# Patient Record
Sex: MALE | Race: OTHER | Employment: FULL TIME | URBAN - METROPOLITAN AREA
[De-identification: names, ages, dates, MRNs, and addresses within clinical notes are randomized per-mention and may not be internally consistent; named-entity substitution may affect disease eponyms.]

---

## 2021-09-05 ENCOUNTER — HOSPITAL ENCOUNTER (EMERGENCY)
Age: 31
Discharge: HOME | End: 2021-09-05
Payer: COMMERCIAL

## 2021-09-05 VITALS
HEART RATE: 98 BPM | DIASTOLIC BLOOD PRESSURE: 86 MMHG | RESPIRATION RATE: 16 BRPM | TEMPERATURE: 98 F | HEIGHT: 75 IN | OXYGEN SATURATION: 97 % | BODY MASS INDEX: 27.14 KG/M2 | SYSTOLIC BLOOD PRESSURE: 145 MMHG | WEIGHT: 218.26 LBS

## 2021-09-05 DIAGNOSIS — K62.5 RECTAL BLEEDING: Primary | ICD-10-CM

## 2021-09-05 LAB
CHP ED QC CHECK: NORMAL
HCT VFR BLD CALC: 50.7 % (ref 37–54)
HEMOCCULT STL QL: POSITIVE
HEMOGLOBIN: 17.2 G/DL (ref 12.5–16.5)
MCH RBC QN AUTO: 29.2 PG (ref 26–35)
MCHC RBC AUTO-ENTMCNC: 33.9 % (ref 32–34.5)
MCV RBC AUTO: 85.9 FL (ref 80–99.9)
PDW BLD-RTO: 13 FL (ref 11.5–15)
PLATELET # BLD: 367 E9/L (ref 130–450)
PMV BLD AUTO: 9.8 FL (ref 7–12)
RBC # BLD: 5.9 E12/L (ref 3.8–5.8)
WBC # BLD: 9.4 E9/L (ref 4.5–11.5)

## 2021-09-05 PROCEDURE — 99282 EMERGENCY DEPT VISIT SF MDM: CPT

## 2021-09-05 PROCEDURE — 85027 COMPLETE CBC AUTOMATED: CPT

## 2021-09-05 NOTE — ED NOTES
FIRST PROVIDER CONTACT ASSESSMENT NOTE                                                                                                Department of Emergency Medicine                                                      First Provider Note  21  2:29 PM EDT  NAME: Hiwot Katz  : 1990  MRN: [id number]    Chief Complaint: Rectal Bleeding (had blood with bowel movement)      History of Present Illness:   Hiwot Katz is a 27 y.o. male who presents to the ED for rectal pain with blood in stool x 1 wk     Focused Physical Exam:  VS:    ED Triage Vitals   BP Temp Temp src Pulse Resp SpO2 Height Weight   21 1429 21 1429 -- 21 1354 21 1429 21 1354 21 1429 21 1429   (!) 151/106 98 °F (36.7 °C)  107 16 97 % 6' 3\" (1.905 m) 218 lb 4.1 oz (99 kg)        General: Alert and in no apparent distress. Medical History:  has no past medical history on file. Surgical History:  has no past surgical history on file. Social History:      Family History: family history is not on file. Allergies: Patient has no known allergies.      Initial Plan of Care:  Initiate Treatment-Testing, Proceed toTreatment Area When Bed Available for ED Attending/MLP to Continue Care    -------------------------------------------------[address] CONTACT ASSESSMENT NOTE--------------------------------------------------------  Electronically signed by ENRIQUE Reardon   DD: 21       ENRIQUE Alves  [phone]

## 2021-09-05 NOTE — ED PROVIDER NOTES
[facility]    HPI:  9/5/21,   Time: 5:42 PM EDT         Farooq Corea is a 27 y.o. male presenting to the ED for rectal bleeding, beginning earlier today ago. The complaint has been intermittent, mild in severity, and worsened by nothing. Patient presents stating that after having a bowel movement earlier today after wiping he noticed that there was blood on the tissue paper concerned him. He denies any abdominal pain. Denies any known history of hemorrhoids. ROS:   Pertinent positives and negatives are stated within HPI, all other systems reviewed and are negative.  --------------------------------------------- PAST HISTORY ---------------------------------------------  Past Medical History:  has no past medical history on file. Past Surgical History:  has no past surgical history on file. Social History:  reports that he has never smoked. He has never used smokeless tobacco. He reports that he does not drink alcohol. Family History: family history is not on file. The patients home medications have been reviewed. Allergies: Patient has no known allergies.     -------------------------------------------------- RESULTS -------------------------------------------------  All laboratory and radiology results have been personally reviewed by myself   LABS:  Results for orders placed or performed during the hospital encounter of 09/05/21   CBC   Result Value Ref Range    WBC 9.4 4.5 - 11.5 E9/L    RBC 5.90 (H) 3.80 - 5.80 E12/L    Hemoglobin 17.2 (H) 12.5 - 16.5 g/dL    Hematocrit 50.7 37.0 - 54.0 %    MCV 85.9 80.0 - 99.9 fL    MCH 29.2 26.0 - 35.0 pg    MCHC 33.9 32.0 - 34.5 %    RDW 13.0 11.5 - 15.0 fL    Platelets 709 078 - 577 E9/L    MPV 9.8 7.0 - 12.0 fL   POCT occult blood stool   Result Value Ref Range    OCCULT BLOOD FECAL positive     QC OK? p        RADIOLOGY:  Interpreted by Radiologist.  No orders to display       ------------------------- NURSING NOTES AND VITALS REVIEWED ---------------------------   The nursing notes within the ED encounter and vital signs as below have been reviewed. BP (!) 145/86   Pulse 98   Temp 98 °F (36.7 °C)   Resp 16   Ht 6' 3\" (1.905 m)   Wt 218 lb 4.1 oz (99 kg)   SpO2 97%   BMI 27.28 kg/m²   Oxygen Saturation Interpretation: Normal      ---------------------------------------------------PHYSICAL EXAM--------------------------------------      Constitutional/General: Alert and oriented x3, well appearing, non toxic in NAD  Head: NC/AT  Eyes: PERRL, EOMI  Mouth: Oropharynx clear, handling secretions, no trismus  Neck: Supple, full ROM, no meningeal signs  Pulmonary: Lungs clear to auscultation bilaterally, no wheezes, rales, or rhonchi. Not in respiratory distress  Cardiovascular:  Regular rate and rhythm, no murmurs, gallops, or rubs. 2+ distal pulses  Abdomen: Soft, non tender, non distended, digital rectal exam no hemorrhoids, fissures or abnormality stool is brown on gloved finger however is faintly guaiac positive. Extremities: Moves all extremities x 4. Warm and well perfused  Skin: warm and dry without rash  Neurologic: GCS 15,  Psych: Normal Affect      ------------------------------ ED COURSE/MEDICAL DECISION MAKING----------------------  Medications - No data to display      Medical Decision Making:    He does have an upcoming appointment with primary care physician on Thursday and advised to follow-up with PCP. His hemoglobin is at 17. If any change or worsening symptoms advised to return back to the emergency room. Counseling: The emergency provider has spoken with the patient and discussed todays results, in addition to providing specific details for the plan of care and counseling regarding the diagnosis and prognosis.   Questions are answered at this time and they are agreeable with the plan.      --------------------------------- IMPRESSION AND DISPOSITION ---------------------------------    IMPRESSION  1.

## 2021-09-09 ENCOUNTER — OFFICE VISIT (OUTPATIENT)
Dept: SURGERY | Age: 31
End: 2021-09-09
Payer: COMMERCIAL

## 2021-09-09 ENCOUNTER — PREP FOR PROCEDURE (OUTPATIENT)
Dept: SURGERY | Age: 31
End: 2021-09-09

## 2021-09-09 ENCOUNTER — TELEPHONE (OUTPATIENT)
Dept: SURGERY | Age: 31
End: 2021-09-09

## 2021-09-09 VITALS
HEART RATE: 98 BPM | BODY MASS INDEX: 27.35 KG/M2 | DIASTOLIC BLOOD PRESSURE: 94 MMHG | TEMPERATURE: 98.2 F | SYSTOLIC BLOOD PRESSURE: 129 MMHG | HEIGHT: 75 IN | RESPIRATION RATE: 16 BRPM | OXYGEN SATURATION: 100 % | WEIGHT: 220 LBS

## 2021-09-09 DIAGNOSIS — K62.5 RECTAL BLEEDING: Primary | ICD-10-CM

## 2021-09-09 DIAGNOSIS — K62.89 ANAL PAIN: ICD-10-CM

## 2021-09-09 PROCEDURE — 99203 OFFICE O/P NEW LOW 30 MIN: CPT | Performed by: SURGERY

## 2021-09-09 PROCEDURE — 1036F TOBACCO NON-USER: CPT | Performed by: SURGERY

## 2021-09-09 PROCEDURE — G8419 CALC BMI OUT NRM PARAM NOF/U: HCPCS | Performed by: SURGERY

## 2021-09-09 PROCEDURE — 99202 OFFICE O/P NEW SF 15 MIN: CPT | Performed by: SURGERY

## 2021-09-09 PROCEDURE — G8427 DOCREV CUR MEDS BY ELIG CLIN: HCPCS | Performed by: SURGERY

## 2021-09-09 RX ORDER — BISACODYL 5 MG
TABLET, DELAYED RELEASE (ENTERIC COATED) ORAL
Qty: 8 TABLET | Refills: 0 | Status: ON HOLD
Start: 2021-09-09 | End: 2021-10-06 | Stop reason: HOSPADM

## 2021-09-09 RX ORDER — SODIUM CHLORIDE 0.9 % (FLUSH) 0.9 %
10 SYRINGE (ML) INJECTION EVERY 12 HOURS SCHEDULED
Status: CANCELLED | OUTPATIENT
Start: 2021-09-09

## 2021-09-09 RX ORDER — SODIUM CHLORIDE, SODIUM LACTATE, POTASSIUM CHLORIDE, CALCIUM CHLORIDE 600; 310; 30; 20 MG/100ML; MG/100ML; MG/100ML; MG/100ML
INJECTION, SOLUTION INTRAVENOUS CONTINUOUS
Status: CANCELLED | OUTPATIENT
Start: 2021-09-09

## 2021-09-09 RX ORDER — HYDROCORTISONE 25 MG/G
CREAM TOPICAL
Qty: 1 EACH | Refills: 3 | Status: ON HOLD
Start: 2021-09-09 | End: 2021-10-06 | Stop reason: HOSPADM

## 2021-09-09 RX ORDER — SODIUM CHLORIDE 0.9 % (FLUSH) 0.9 %
10 SYRINGE (ML) INJECTION PRN
Status: CANCELLED | OUTPATIENT
Start: 2021-09-09

## 2021-09-09 RX ORDER — SODIUM CHLORIDE 9 MG/ML
25 INJECTION, SOLUTION INTRAVENOUS PRN
Status: CANCELLED | OUTPATIENT
Start: 2021-09-09

## 2021-09-09 NOTE — PROGRESS NOTES
Scheduled pt for Colonoscopy on 10/6/21 at 8:30 AM. Pt needs to arrive at [address] [place], [place] [address]ion [facility] Mattson at 7:30 AM. Michelle Palafox pt directions and instruction sheet in office. Pt accepted date/time and verbalized understanding.     Electronically signed by Sherie Grey on 9/9/21 at 3:14 PM EDT

## 2021-09-09 NOTE — TELEPHONE ENCOUNTER
Prior Authorization Form:      DEMOGRAPHICS:                     Patient Name:  Isidoro Jose  Patient :  1990            Insurance:  Payor: [employer] / Plan: [employer] [address] / Product Type: *No Product type* /   Insurance ID Number:    Payor/Plan Subscr  Sex Relation Sub. Ins. ID Effective Group Num   1.  [address]* 1990 Male Self [id number]                                   [address]         DIAGNOSIS & PROCEDURE:                       Procedure/Operation: COLONOSCOPY           CPT Code: 63814    Diagnosis:  RECTAL BLEEDING    ICD10 Code: K62.5    Location:  [facility]    Surgeon:  DR. Nasreen Jordan    SCHEDULING INFORMATION:                          Date: 10/6/21    Time: 8:30 AM              Anesthesia:  [facility]                                                       Status:  Outpatient        Special Comments:  N/A       Electronically signed by Kali Hendricks on 2021 at 4:28 PM

## 2021-09-09 NOTE — H&P
GENERAL SURGERY  HISTORY AND PHYSICAL  9/9/2021    Chief Complaint   Patient presents with    Consultation     new-Hemorrhoids, seen in ER 9/5/21       HPI  Rosalie Khoury is a 27 y.o. male who recently immigrated from [facility] presents for evaluation of rectal bleeding that began 10 days ago. He states that he had a BM and noticed blood streaking in his stool, this occurs with every BM and is associated with rectal discomfort during defecation but describes the pain as quite mild. He believes that bleeding has been worsening and visited the ED 9/5 and was found to have hbg 17.2. He denies any weight loss, diarrhea, constipation, change in stool caliber or colour. He has been avoiding eating due to fear of going to the bathroom as he fears he will see blood again and is having BM now every other day. Never had colonoscopy. Had EGD in [facility] in June and was subsequently treated for H.pylori and told it was erradicated. Denies any medication use, history of hemorrhoids. Family history is negative for colon cancers or polyps. History reviewed. No pertinent past medical history. History reviewed. No pertinent surgical history. Prior to Admission medications    Not on File       No Known Allergies    History reviewed. No pertinent family history.     Social History     Tobacco Use    Smoking status: Never Smoker    Smokeless tobacco: Never Used   Vaping Use    Vaping Use: Never used   Substance Use Topics    Alcohol use: Never    Drug use: Never         Review of Systems - History obtained from the patient  General ROS: negative  Psychological ROS: negative  Ophthalmic ROS: negative  ENT ROS: negative  Allergy and Immunology ROS: negative  Hematological and Lymphatic ROS: negative  Endocrine ROS: negative  Respiratory ROS: no cough, shortness of breath, or wheezing  Cardiovascular ROS: no chest pain or dyspnea on exertion  Gastrointestinal ROS: no abdominal pain, change in bowel habits, or black or bloody stools  Genito-Urinary ROS: no dysuria, trouble voiding, or hematuria  Musculoskeletal ROS: negative  Neurological ROS: no TIA or stroke symptoms  Dermatological ROS: negative      PHYSICAL EXAM:    Vitals:    09/09/21 1320   BP: (!) 129/94   Pulse: 98   Resp: 16   Temp: 98.2 °F (36.8 °C)   SpO2: 100%       General Appearance:  awake, alert, oriented, in no acute distress  Skin:  Skin color, texture, turgor normal. No rashes or lesions. Head/face:  NCAT  Eyes:  No gross abnormalities. Lungs:  Normal expansion. Clear to auscultation. No rales, rhonchi, or wheezing. Heart:  Heart sounds are normal.  Regular rate and rhythm without murmur, gallop or rub. Abdomen:  Soft, non-tender, normal bowel sounds. No bruits, organomegaly or masses. Extremities: Extremities warm to touch, pink, with no edema. Neurologic:  negative  Male Rectal:  Normal male: no hemorrhoids, normal rectal tone, no masses, prostate not enlarged, no nodularity. No krissy blood, FOBT(-). LABS:  CBC  No results for input(s): WBC, HGB, HCT, PLT in the last 72 hours. BMP  No results for input(s): NA, K, CL, CO2, BUN, CREATININE, CALCIUM in the last 72 hours. Invalid input(s): GLU  Liver Function  No results for input(s): AMYLASE, LIPASE, BILITOT, BILIDIR, AST, ALT, ALKPHOS, PROT, LABALBU in the last 72 hours. No results for input(s): LACTATE in the last 72 hours. No results for input(s): INR, PTT in the last 72 hours. Invalid input(s): PT    RADIOLOGY  No results found.       ASSESSMENT:  27 y.o. male with rectal bleeding for 10 days     PLAN:      Electronically signed by Aga Goode MD on 9/9/21 at 2:13 PM EDT

## 2021-09-09 NOTE — PATIENT INSTRUCTIONS
Dr. Paula Mosquera recommended Sitz bath or warm soak in tube for 30 minute followed by application of Anusol HC cream to anal area as directed, three times a day and after each BM x 2 weeks then as needed. He also recommended a high fiber diet (see below). Dr. Paula Mosquera recommended colonoscopy with possible biopsy or polypectomy and he explained the risk, benefits, expected outcome, and alternatives to the procedure. Risks included but are not limited to bleeding, infection, respiratory distress, hypotension, and perforation of the colon. You understood and were in agreement. You will need to have someone bring you to the hospital and take you home because you will not be able to drive or work the rest of that day. Also, you need to have someone stay with you the rest of the day to make sure you do not develop any complications. [facility] General Surgery  MAGNESIUM CITRATE/DULCOLAX TABLETS  COLON PREP FOR COLONOSCOPY OR COLON SURGERY    It is very important that you follow all of the instructions listed on this sheet carefully (they may be slightly different than the directions on the product that you purchase at the pharmacy) to ensure that your colon is adequately cleaned out or your risk of complications could be increased. 2 Days or More Before Endoscopy:   Obtain three 10-ounce bottle of Magnesium Citrate and 1 bottle of Dulcolax tablets from the pharmacy.  Do not eat corn, tomatoes, peas or watermelon 5 days before procedure.  If you are on INSULIN or OTHER DIABETIC MEDICATIONS, then check with your primary care physician as to how to adjust your medication while on clear liquid diet and when nothing by mouth. 1 Day Before the Endoscopy:   No solid food  only clear liquids (soup, jello, or juice that you can see through with no solid food) for breakfast, lunch and supper. DO NOT drink or eat anything that is red as it will turn the inside of the colon red and look like blood.  Have at least 8 oz or more of clear liquids for breakfast (7 am to 8 am) and lunch (11:30 am to 12:30 pm).  12 Noon Drink a 10 oz bottle of Magnesium Citrate and 4 Dulcolax tablets followed immediately by at least 8 oz of clear liquids.  1:00 pm Drink at least 8 oz of clear liquids.  2:00 pm Take 4 Dulcolax tablets and drink at least 8 oz of clear liquids.  3:00 pm Drink at least 8 oz of clear liquids.  4:00 pm Drink a 10 oz bottle of Magnesium Citrate followed immediately by at least 8 oz of clear liquids.  5:00 pm Drink at least 8 oz of clear liquids.  Can continue to take liquids until 12 midnight then nothing to eat or drink except as instructed below    Day of Endoscopy:   4 hours prior to scheduled time for colonoscopy, drink a 10 oz bottle of Magnesium Citrate followed immediately by at least 8 oz of clear liquids. Then nothing to drink after that.  If any blood pressure medications or heart medications are due in the morning, you should take them with a sip of water. Patient Information and Instructions for Colonoscopy         Definition of Colonoscopy   A colonoscopy is the visual exam of the rectum and colon (large intestine). The exam is done with a tool called a colonoscope. The colonoscope is a flexible tube with a tiny camera on the end. This instrument allows the doctor to view the inside of your rectum and colon. Sigmoidoscopy is a shorter scope that views only the last one third of the colon. Reasons for Colonoscopy   It is used to examine, diagnose, and treat problems in your large intestine. The procedure is most often done for the following reasons:    To determine the cause of abdominal pain, rectal bleeding, or a change in bowel habits   To detect and treat colon cancer or colon polyps   To obtain tissue samples for testing   To stop intestinal bleeding   Monitor response to treatment if you have inflammatory bowel disease     Possible Complications   Complications are rare, but no procedure is completely free of risk. If you are planning to have a colonoscopy, your doctor will review a list of possible complications, which may include:   Bleeding   Reaction to the sedation causing drop in your blood pressure or problems breathing  Perforation or puncture of the bowel     Factors that may increase the risk of complications include:   Pre-existing heart or kidney condition   Treatment with certain medicines, including aspirin and other drugs with anticoagulant or blood-thinning properties   Prior abdominal surgery or radiation treatments   Active colitis , diverticulitis , or other acute bowel disease   Previous treatment with radiation therapy     Be sure to discuss these risks with your doctor before the procedure. What to Expect   Prior to Procedure   Your doctor will likely do the following:   Physical exam   Health history   Review of medicines   Test your stool for hidden blood (called \"occult blood\")     Your colon must be completely clean before the procedure. Any stool left in the intestine will block the view. This preparation may start several days before the procedure. Follow your doctor's instructions. Leading up to your procedure:   Talk to your doctor about your medicines. You may be asked to stop taking some medicines up to one week before the procedure, like:   Anti-inflammatory drugs (e.g., aspirin )   Blood thinners like clopidogrel (Plavix) or warfarin (Coumadin)   Iron supplements or vitamins containing iron   The day or days before your procedure, go on a clear liquid diet (clear broth, clear juice, clear jello) with no red coloring  Do not eat or drink anything after midnight. Wear comfortable clothing. If you have diabetes, ask your doctor if you need to adjust your diabetes medicine on the day prior to your procedure and the day of your procedure. Arrange for a ride home after the procedure.      Anesthesia   You will receive intravenous sedation medicine for the procedure so you will not feel anything during the procedure. Description of the Procedure   You will lie on your left side with knees bent and drawn up toward your chest. The colonoscope will be slowly inserted through the rectum and into the bowel. The colonoscope will inject air into the colon. A small attached video camera will allow the doctor to view the colon's lining on a screen. The doctor will continue guiding the tool through the bowel and assess the lining. A tissue sample or polyps may be removed during the procedure. How Long Will It Take? Usually it takes about 30 to 45 minutes     Will It Hurt? Most people do not feel anything during the procedure and will not remember the procedure. After the procedure, gas pains and cramping are common. These pains should go away with the passing of gas. Post-procedure Care   If any tissue was removed: It will be sent to a lab to be examined. It may take 1-2 weeks for results. The doctor will usually give an initial report after the scope is removed. Other tests may be recommended. A small amount of bleeding may occur during the first few days after the procedure. When you return home after the procedure, be sure to follow your doctor's instructions, which may include:   Resume medicines as instructed by your doctor. Resume normal diet, unless directed otherwise by your doctor. The sedative will make you drowsy. Avoid driving, operating machinery, or making important decisions for the rest of the day. Rest for the remainder of the day. After arriving home, contact your doctor if any of the following occurs:   Bleeding from your rectum, notify your doctor if you pass a teaspoonful of blood or more.    Black, tarry stools   Severe abdominal pain   Hard, swollen abdomen   Signs of infection, including fever or chills   Inability to pass gas or stool   Coughing, shortness of breath, chest pain, severe nausea or vomiting     In case of an emergency, CALL 911 . Instructions for High-Fiber Diet   What Is Fiber? Dietary fiber is a form of carbohydrate found in plants that cannot be digested by humans. All plants contain fiber, including fruits, vegetables, grains, and legumes. Fiber is often classified into two categories: soluble and insoluble. Soluble fiber draws water into the bowel and can help slow digestion. Examples of foods that are high in soluble fiber include oatmeal, oat bran, barley, legumes (eg, beans and peas), apples, and strawberries. Insoluble fiber speeds digestion and can add bulk to the stool. Examples of foods that are high in insoluble fiber include whole-wheat products, wheat bran, cauliflower, green beans, and potatoes. Why Follow a High-Fiber Diet? A high-fiber diet is often recommended to prevent and treat constipation , hemorrhoids , diverticulitis , and irritable bowel syndrome . Eating a high-fiber diet can also help improve your cholesterol levels, lower your risk of coronary heart disease , reduce your risk of type 2 diabetes , and lower your weight. For people with type 1 or 2 diabetes, a high-fiber diet can also help stabilize blood sugar levels. How Much Fiber Should I Eat? A high-fiber diet should contain 20-35 grams of fiber a day. This is actually the amount recommended for the general adult population; however, most Americans eat only 15 grams of fiber per day. Digestion of Fiber     Eating a higher fiber diet than usual can take some getting used to by your body's digestive system. To avoid the side effects of sudden increases in dietary fiber (eg, gas, cramping, bloating, and diarrhea), increase fiber gradually and be sure to drink plenty of fluids every day. Tips for Increasing Fiber Intake   Whenever possible, choose whole grains over refined grains (eg, brown rice instead of white rice, whole-wheat bread instead of white bread).    Include a variety of grains in your diet, such as wheat, rye, barley, oats, quinoa, and bulgur. Eat more vegetarian-based meals. Here are some ideas: black bean burgers, eggplant lasagna, and veggie tofu stir-harrison. Choose high-fiber snacks, such as fruits, popcorn, whole-grain crackers, and nuts. Make whole-grain cereal or whole-grain toast part of your daily breakfast regime. When eating out, whether ordering a sandwich or dinner, ask for extra vegetables. When baking, replace part of the white flour with whole-wheat flour. Whole-wheat flour is particularly easy to incorporate into a recipe. High-Fiber Diet Eating Guide     Food Category, Foods Recommended, Notes     Grains   Whole-grain breads, muffins, bagels, or shar bread Rye bread Whole-wheat crackers or crisp breads Whole-grain or bran cereals Oatmeal, oat bran, or grits Wheat germ Whole-wheat pasta and brown rice   Read the ingredients list on food labels. Look for products that list \"whole\" as the first ingredient (eg, whole-wheat, whole oats). Choose cereals with at least 2 grams of fiber per serving. Vegetables   All vegetables, especially asparagus, bean sprouts, broccoli, [place] sprouts, cabbage, carrots, cauliflower, celery, corn, greens, green beans, green pepper, onions, peas, potatoes (with skin), snow peas, spinach, squash, sweet potatoes, tomatoes, zucchini. For maximum fiber intake, eat the peels of fruits and vegetables just be sure to wash them well first.     Fruits   All fruits, especially apples, berries, grapefruits, mangoes, nectarines, oranges, peaches, pears, dried fruits (figs, dates, prunes, raisins)   Choose raw fruits and vegetables over juice, cooked, or canned raw fruit has more fiber. Dried fruit is also a good source of fiber. Milk   With the exception of yogurt containing inulin (a type of fiber), dairy foods provide little fiber.   Add more fiber by topping your yogurt or cottage cheese with fresh fruit, whole grain or bran cereals, nuts, or seeds. Meats and Beans   All beans and peas, especially Garbanzo beans, kidney beans, lentils, lima beans, split peas, and hdz beans All nuts and seeds, especially almonds, peanuts, [place] nuts, cashews, peanut butter, walnuts, sesame and sunflower seeds All meat, poultry, fish, and eggs. Increase fiber in meat dishes by adding hdz beans, kidney beans, black-eyed peas, bran, or oatmeal. If you are following a low-fat diet, use nuts and seeds only in moderation. Fats and Oils   All in moderation. Fats and oils do not provide fiber. Snacks, Sweets, and Condiments   Fruit Nuts Popcorn, whole-wheat pretzels, or trail mix made with dried fruits, nuts, and seeds Cakes, breads, and cookies made with oatmeal or whole-wheat flour   Most snack foods do not provide much fiber. Choose snacks with at least 2 grams of fiber per serving.

## 2021-09-10 NOTE — H&P (VIEW-ONLY)
History and Physical    Patient's Name/Date of Birth: Arsenio Fox /1990, (27 y.o.), male    Date: September 9, 2021     Assessment/Plan:  1. Rectal bleeding and anal pain - most likely is secondary to internal hemorrhoids or proctitis. I recommended diagnostic colonoscopy with possible biopsy or polypectomy and explained the risk, benefits, expected outcome, and alternatives to the procedure. Risks included but are not limited to bleeding, infection, respiratory distress, hypotension, and perforation of the colon. The patient understands and is in agreement. In the meantime, recommend patient start on sitz baths 3 times a day and after each bowel movement with application of Anusol HC cream internally externally to the anal area after each sitz bath. Patient is agreement and I E prescribed the Anusol HC to his pharmacy. Chief Complaint   Patient presents with    Consultation     new-Hemorrhoids, seen in ER 9/5/21     HPI:   Patient was seen in the office today for intermittent rectal bleeding that began about 10 days ago. He stated that he had a bowel movement noticed blood streaking in his stool that was occurring with every bowel movement. This is also associated with anal pain and burning with defecation. He believes the bleeding was worsening so on 9/5/2021 he went to the [facility] ED. His hemoglobin was 17.2 and his vital signs are stable. He was discharged from the ED with instructions to follow-up with general surgery. Due to the pain with defecation has been trying to hold back his bowel movements and is only been having a bowel in every several days. He is never had a colonoscopy. He recently migrated from [facility]. In [facility], he had an EGD on 6/20/2021 and was found to have H. pylori gastritis and was treated for this. Family history is negative for colon cancer or colon polyps. History reviewed. No pertinent past medical history.     History reviewed. No pertinent surgical history. Current Outpatient Medications   Medication Sig Dispense Refill    bisacodyl (DULCOLAX) 5 MG EC tablet Take 4 tablets orally twice the day before colonoscopy as directed 8 tablet 0    magnesium citrate solution Take 300 mLs by mouth 3 times daily for 3 doses Take one 10 ounce bottle three times the day before colonoscopy as directed 900 mL 0    hydrocortisone (ANUSOL-HC) 2.5 % CREA rectal cream Apply to anal area as directed 3 times a day and after each BM. 1 each 3     No current facility-administered medications for this visit. No Known Allergies    Review of Systems  Non-contributory    Physical Exam:  Vitals:    09/09/21 1320   BP: (!) 129/94   Site: Left Upper Arm   Position: Sitting   Cuff Size: Large Adult   Pulse: 98   Resp: 16   Temp: 98.2 °F (36.8 °C)   TempSrc: Infrared   SpO2: 100%   Weight: 220 lb (99.8 kg)   Height: 6' 3\" (1.905 m)       Body mass index is 27.5 kg/m². Physical Exam  Chest-breath sounds were clear and equal bilateral with no rales rhonchi. Heart-heart sounds were normal with regular rate rhythm no murmurs gallops. Abdomen-bowel sounds are normal active. The abdomen is soft, nondistended, nontender    Rectal-was performed by the resident who reported no external hemorrhoids or inflammation, sphincter tone is normal, prostate was normal, no rectal masses, and stool was Hemoccult negative.     Electronically signed by Ced Briones MD on 9/9/21 at 8:46 PM EDT

## 2021-09-10 NOTE — PROGRESS NOTES
History and Physical    Patient's Name/Date of Birth: Elo Galvan /1990, (27 y.o.), male    Date: September 9, 2021     Assessment/Plan:  1. Rectal bleeding and anal pain - most likely is secondary to internal hemorrhoids or proctitis. I recommended diagnostic colonoscopy with possible biopsy or polypectomy and explained the risk, benefits, expected outcome, and alternatives to the procedure. Risks included but are not limited to bleeding, infection, respiratory distress, hypotension, and perforation of the colon. The patient understands and is in agreement. In the meantime, recommend patient start on sitz baths 3 times a day and after each bowel movement with application of Anusol HC cream internally externally to the anal area after each sitz bath. Patient is agreement and I E prescribed the Anusol HC to his pharmacy. Chief Complaint   Patient presents with    Consultation     new-Hemorrhoids, seen in ER 9/5/21     HPI:   Patient was seen in the office today for intermittent rectal bleeding that began about 10 days ago. He stated that he had a bowel movement noticed blood streaking in his stool that was occurring with every bowel movement. This is also associated with anal pain and burning with defecation. He believes the bleeding was worsening so on 9/5/2021 he went to the [facility] ED. His hemoglobin was 17.2 and his vital signs are stable. He was discharged from the ED with instructions to follow-up with general surgery. Due to the pain with defecation has been trying to hold back his bowel movements and is only been having a bowel in every several days. He is never had a colonoscopy. He recently migrated from [facility]. In [facility], he had an EGD on 6/20/2021 and was found to have H. pylori gastritis and was treated for this. Family history is negative for colon cancer or colon polyps. History reviewed. No pertinent past medical history.     History reviewed. No pertinent surgical history. Current Outpatient Medications   Medication Sig Dispense Refill    bisacodyl (DULCOLAX) 5 MG EC tablet Take 4 tablets orally twice the day before colonoscopy as directed 8 tablet 0    magnesium citrate solution Take 300 mLs by mouth 3 times daily for 3 doses Take one 10 ounce bottle three times the day before colonoscopy as directed 900 mL 0    hydrocortisone (ANUSOL-HC) 2.5 % CREA rectal cream Apply to anal area as directed 3 times a day and after each BM. 1 each 3     No current facility-administered medications for this visit. No Known Allergies    Review of Systems  Non-contributory    Physical Exam:  Vitals:    09/09/21 1320   BP: (!) 129/94   Site: Left Upper Arm   Position: Sitting   Cuff Size: Large Adult   Pulse: 98   Resp: 16   Temp: 98.2 °F (36.8 °C)   TempSrc: Infrared   SpO2: 100%   Weight: 220 lb (99.8 kg)   Height: 6' 3\" (1.905 m)       Body mass index is 27.5 kg/m². Physical Exam  Chest-breath sounds were clear and equal bilateral with no rales rhonchi. Heart-heart sounds were normal with regular rate rhythm no murmurs gallops. Abdomen-bowel sounds are normal active. The abdomen is soft, nondistended, nontender    Rectal-was performed by the resident who reported no external hemorrhoids or inflammation, sphincter tone is normal, prostate was normal, no rectal masses, and stool was Hemoccult negative.     Electronically signed by Bella Pitts MD on 9/9/21 at 8:46 PM EDT

## 2021-09-10 NOTE — H&P
History and Physical    Patient's Name/Date of Birth: Isidro Levine /1990, (27 y.o.), male    Date: September 9, 2021     Assessment/Plan:  1. Rectal bleeding and anal pain - most likely is secondary to internal hemorrhoids or proctitis. I recommended diagnostic colonoscopy with possible biopsy or polypectomy and explained the risk, benefits, expected outcome, and alternatives to the procedure. Risks included but are not limited to bleeding, infection, respiratory distress, hypotension, and perforation of the colon. The patient understands and is in agreement. In the meantime, recommend patient start on sitz baths 3 times a day and after each bowel movement with application of Anusol HC cream internally externally to the anal area after each sitz bath. Patient is agreement and I E prescribed the Anusol HC to his pharmacy. Chief Complaint   Patient presents with    Consultation     new-Hemorrhoids, seen in ER 9/5/21     HPI:   Patient was seen in the office today for intermittent rectal bleeding that began about 10 days ago. He stated that he had a bowel movement noticed blood streaking in his stool that was occurring with every bowel movement. This is also associated with anal pain and burning with defecation. He believes the bleeding was worsening so on 9/5/2021 he went to the [facility] ED. His hemoglobin was 17.2 and his vital signs are stable. He was discharged from the ED with instructions to follow-up with general surgery. Due to the pain with defecation has been trying to hold back his bowel movements and is only been having a bowel in every several days. He is never had a colonoscopy. He recently migrated from [facility]. In [facility], he had an EGD on 6/20/2021 and was found to have H. pylori gastritis and was treated for this. Family history is negative for colon cancer or colon polyps. History reviewed. No pertinent past medical history.     History reviewed. No pertinent surgical history. Current Outpatient Medications   Medication Sig Dispense Refill    bisacodyl (DULCOLAX) 5 MG EC tablet Take 4 tablets orally twice the day before colonoscopy as directed 8 tablet 0    magnesium citrate solution Take 300 mLs by mouth 3 times daily for 3 doses Take one 10 ounce bottle three times the day before colonoscopy as directed 900 mL 0    hydrocortisone (ANUSOL-HC) 2.5 % CREA rectal cream Apply to anal area as directed 3 times a day and after each BM. 1 each 3     No current facility-administered medications for this visit. No Known Allergies    Review of Systems  Non-contributory    Physical Exam:  Vitals:    09/09/21 1320   BP: (!) 129/94   Site: Left Upper Arm   Position: Sitting   Cuff Size: Large Adult   Pulse: 98   Resp: 16   Temp: 98.2 °F (36.8 °C)   TempSrc: Infrared   SpO2: 100%   Weight: 220 lb (99.8 kg)   Height: 6' 3\" (1.905 m)       Body mass index is 27.5 kg/m². Physical Exam  Chest-breath sounds were clear and equal bilateral with no rales rhonchi. Heart-heart sounds were normal with regular rate rhythm no murmurs gallops. Abdomen-bowel sounds are normal active. The abdomen is soft, nondistended, nontender    Rectal-was performed by the resident who reported no external hemorrhoids or inflammation, sphincter tone is normal, prostate was normal, no rectal masses, and stool was Hemoccult negative.     Electronically signed by Martine Mock MD on 9/9/21 at 8:46 PM EDT

## 2021-09-28 NOTE — PROGRESS NOTES
Patient states has received the last dose of the COVID vaccine and is 2 weeks post last dose. Patient instructed to bring the [employer] card or a picture of the card,  day of surgery. Patient verbalized understanding.

## 2021-10-04 NOTE — PROGRESS NOTES
[facility] PRE-ADMISSION TESTING ENDOSCOPY INSTRUCTIONS- [facility]-phone number:[phone]    ENDOSCOPY INSTRUCTIONS:   [x] Bowel prep instructions reviewed. [x] Nothing by mouth after midnight, including gum, candy, mints, or water. Please follow your surgeons instructions if you are required to complete a bowel prep. Colonoscopy- no solid food-only clear liquids the day prior). [x] You may brush your teeth, gargle, but do NOT swallow water. [] Do not wear makeup, lotions, powders, deodorant. Nail polish as directed by the nurse. [x] Arrange transportation with a responsible adult  to and from the hospital. If you do not have a responsible adult  to transport you, you will need to make arrangements with a medical transportation company (i.e. [employer]. A Uber/taxi/bus is not appropriate unless you are accompanied by a responsible adult ). Arrange for someone to be with you for the remainder of the day and for 24 hours after your procedure due to having had anesthesia. Who will be your  for transportation?_____friend_____________   Who will be staying with you for 24 hrs after your procedure?________friend__________    PARKING INSTRUCTIONS:   [x] Arrival Time:_______0715_________________  · [x] Parking lot  \"I\" OR 1 is located on [facility] (the corner of [facility]). To enter, press the button and the gate will lift. A free token will be provided to exit the lot. One car per patient is allowed to park in this lot. All other cars are to park on [address] [place] [address] either in the parking garage or the handicap lot. [] To reach the [facility] lobby from [address] [place] [address], upon entering the hospital, take elevator B to the 3rd floor.     EDUCATION INSTRUCTIONS:  [] Bring a complete list of your medications, please write the last time you took the medicine, give this list to the nurse.  [] Take the following medications the morning of surgery with 1-2 ounces of water: none  [] Stop herbal supplements and vitamins 5 days before your surgery. [] DO NOT take any diabetic medicine the morning of surgery. Follow instructions for insulin the day before surgery. [] If you are diabetic and your blood sugar is low or you feel symptomatic, you may drink 1-2 ounces of apple juice or take a glucose tablet. The morning of your procedure, you may call the pre-op area if you have concerns about your blood sugar [phone]. [] Use your inhalers the morning of surgery. Bring your emergency inhaler with you day of surgery. [] Follow physician instructions regarding any blood thinners you may be taking. none    WHAT TO EXPECT:  [x] The day of your procedure you will be greeted and checked in by the [employer].  In addition, you will be registered in the [facility] by a Patient Access Representative. Please bring your photo ID and insurance card. A nurse will greet you in accordance to the time you are needed in the pre-op area to prepare you for surgery. Please do not be discouraged if you are not greeted in the order you arrive as there are many variables that are involved in patient preparation. Your patience is greatly appreciated as you wait for your nurse. Please bring in items such as: books, magazines, newspapers, electronics, or any other items  to occupy your time in the waiting area. []  Delays may occur. Staff will make a sincere effort to keep you informed of delays. If any delays occur with your procedure, we apologize ahead of time for your inconvenience as we recognize the value of your time.

## 2021-10-06 ENCOUNTER — HOSPITAL ENCOUNTER (OUTPATIENT)
Age: 31
Setting detail: OUTPATIENT SURGERY
Discharge: HOME | End: 2021-10-06
Attending: SURGERY | Admitting: SURGERY
Payer: COMMERCIAL

## 2021-10-06 ENCOUNTER — ANESTHESIA (OUTPATIENT)
Dept: ENDOSCOPY | Age: 31
End: 2021-10-06
Payer: COMMERCIAL

## 2021-10-06 ENCOUNTER — ANESTHESIA EVENT (OUTPATIENT)
Dept: ENDOSCOPY | Age: 31
End: 2021-10-06
Payer: COMMERCIAL

## 2021-10-06 VITALS
SYSTOLIC BLOOD PRESSURE: 119 MMHG | WEIGHT: 200 LBS | TEMPERATURE: 98.6 F | OXYGEN SATURATION: 100 % | DIASTOLIC BLOOD PRESSURE: 81 MMHG | BODY MASS INDEX: 24.87 KG/M2 | RESPIRATION RATE: 16 BRPM | HEIGHT: 75 IN | HEART RATE: 66 BPM

## 2021-10-06 VITALS
OXYGEN SATURATION: 97 % | RESPIRATION RATE: 12 BRPM | DIASTOLIC BLOOD PRESSURE: 57 MMHG | SYSTOLIC BLOOD PRESSURE: 97 MMHG

## 2021-10-06 PROBLEM — K62.89 ANAL PAIN: Status: RESOLVED | Noted: 2021-09-09 | Resolved: 2021-10-06

## 2021-10-06 PROBLEM — K62.5 RECTAL BLEEDING: Status: RESOLVED | Noted: 2021-09-09 | Resolved: 2021-10-06

## 2021-10-06 PROCEDURE — 3609027000 HC COLONOSCOPY: Performed by: SURGERY

## 2021-10-06 PROCEDURE — 2580000003 HC RX 258: Performed by: SURGERY

## 2021-10-06 PROCEDURE — 2580000003 HC RX 258: Performed by: NURSE ANESTHETIST, CERTIFIED REGISTERED

## 2021-10-06 PROCEDURE — 2709999900 HC NON-CHARGEABLE SUPPLY: Performed by: SURGERY

## 2021-10-06 PROCEDURE — 6360000002 HC RX W HCPCS: Performed by: NURSE ANESTHETIST, CERTIFIED REGISTERED

## 2021-10-06 PROCEDURE — 7100000011 HC PHASE II RECOVERY - ADDTL 15 MIN: Performed by: SURGERY

## 2021-10-06 PROCEDURE — 45378 DIAGNOSTIC COLONOSCOPY: CPT | Performed by: SURGERY

## 2021-10-06 PROCEDURE — 3700000001 HC ADD 15 MINUTES (ANESTHESIA): Performed by: SURGERY

## 2021-10-06 PROCEDURE — 3700000000 HC ANESTHESIA ATTENDED CARE: Performed by: SURGERY

## 2021-10-06 PROCEDURE — 7100000010 HC PHASE II RECOVERY - FIRST 15 MIN: Performed by: SURGERY

## 2021-10-06 RX ORDER — SODIUM CHLORIDE 0.9 % (FLUSH) 0.9 %
10 SYRINGE (ML) INJECTION EVERY 12 HOURS SCHEDULED
Status: DISCONTINUED | OUTPATIENT
Start: 2021-10-06 | End: 2021-10-06 | Stop reason: HOSPADM

## 2021-10-06 RX ORDER — SODIUM CHLORIDE 9 MG/ML
INJECTION, SOLUTION INTRAVENOUS CONTINUOUS PRN
Status: DISCONTINUED | OUTPATIENT
Start: 2021-10-06 | End: 2021-10-06 | Stop reason: SDUPTHER

## 2021-10-06 RX ORDER — PROPOFOL 10 MG/ML
INJECTION, EMULSION INTRAVENOUS PRN
Status: DISCONTINUED | OUTPATIENT
Start: 2021-10-06 | End: 2021-10-06 | Stop reason: SDUPTHER

## 2021-10-06 RX ORDER — SODIUM CHLORIDE, SODIUM LACTATE, POTASSIUM CHLORIDE, CALCIUM CHLORIDE 600; 310; 30; 20 MG/100ML; MG/100ML; MG/100ML; MG/100ML
INJECTION, SOLUTION INTRAVENOUS CONTINUOUS
Status: DISCONTINUED | OUTPATIENT
Start: 2021-10-06 | End: 2021-10-06 | Stop reason: HOSPADM

## 2021-10-06 RX ORDER — SODIUM CHLORIDE 9 MG/ML
25 INJECTION, SOLUTION INTRAVENOUS PRN
Status: DISCONTINUED | OUTPATIENT
Start: 2021-10-06 | End: 2021-10-06 | Stop reason: HOSPADM

## 2021-10-06 RX ORDER — SODIUM CHLORIDE 0.9 % (FLUSH) 0.9 %
10 SYRINGE (ML) INJECTION PRN
Status: DISCONTINUED | OUTPATIENT
Start: 2021-10-06 | End: 2021-10-06 | Stop reason: HOSPADM

## 2021-10-06 RX ADMIN — SODIUM CHLORIDE 25 ML: 9 INJECTION, SOLUTION INTRAVENOUS at 08:03

## 2021-10-06 RX ADMIN — PROPOFOL 310 MG: 10 INJECTION, EMULSION INTRAVENOUS at 08:25

## 2021-10-06 RX ADMIN — SODIUM CHLORIDE: 9 INJECTION, SOLUTION INTRAVENOUS at 08:22

## 2021-10-06 ASSESSMENT — PAIN - FUNCTIONAL ASSESSMENT: PAIN_FUNCTIONAL_ASSESSMENT: 0-10

## 2021-10-06 ASSESSMENT — PAIN SCALES - GENERAL
PAINLEVEL_OUTOF10: 0
PAINLEVEL_OUTOF10: 0

## 2021-10-06 NOTE — OP NOTE
PROCEDURE NOTE    DATE OF PROCEDURE: 10/6/2021    SURGEON: Ann Hart M.D.    ASSISTANT: None    PREOPERATIVE DIAGNOSIS: Diagnostic colonoscopy for rectal bleeding and anal pain    POSTOPERATIVE DIAGNOSIS: Same with normal colon    OPERATION: Total colonoscopy     ANESTHESIA: Local monitored anesthesia. ESTIMATED BLOOD LOSS: less than 50     COMPLICATIONS: None. SPECIMENS:  Was Not Obtained    HISTORY: The patient is a 27y.o. year old male with history of above preop diagnosis. I recommended colonoscopy with possible biopsy or polypectomy and I explained the risk, benefits, expected outcome, and alternatives to the procedure. Risks included but are not limited to bleeding, infection, respiratory distress, hypotension, and perforation of the colon. The patient understands and is in agreement. PROCEDURE: The patient was given IV conscious sedation per anesthesia. The patient was given supplemental oxygen by nasal cannula. The colonoscope was inserted per rectum and advanced under direct vision to the cecum with difficulty requiring change in position and pressure on the abdomen. The prep was good so exam was adequate. FINDINGS:  Cecum/Ascending colon: normal    Transverse colon: normal    Descending/Sigmoid colon: normal    Rectum/Anus: examined in normal and retroflexed positions and was normal    The colon was decompressed and the scope was removed. The withdraw time was approximately 9 minutes. The patient tolerated the procedure well. ASSESSMENT/PLAN:   1. Rectal bleeding and anal pain - most likely was due to hemorrhagic proctitis which is subsequently resolved. Monitor for any further bleeding or symptoms.   2. Colorectal Cancer Screening - recommend repeat colonoscopy at age 39 and then every 10 years thereafter    Electronically signed by Ayaz Nieves MD on 10/6/21 at 8:47 AM EDT

## 2021-10-06 NOTE — ANESTHESIA POSTPROCEDURE EVALUATION
Department of Anesthesiology  Postprocedure Note    Patient: Leo Valdez  MRN: [id number]  YOB: 1990  Date of evaluation: 10/6/2021  Time:  11:10 AM     Procedure Summary     Date: 10/06/21 Room / Location: [facility] / [facility]    Anesthesia Start: 3339 Anesthesia Stop: 5622    Procedure: COLONOSCOPY DIAGNOSTIC (N/A ) Diagnosis: (RECTAL BLEEDING)    Surgeons: Lefty Wild MD Responsible Provider: Lois Cosby DO    Anesthesia Type: MAC ASA Status: 2          Anesthesia Type: MAC    Rema Phase I: Rema Score: 10    Rema Phase II: Rema Score: 10    Last vitals: Reviewed and per EMR flowsheets.        Anesthesia Post Evaluation    Patient location during evaluation: bedside  Patient participation: complete - patient cannot participate  Level of consciousness: awake and alert  Airway patency: patent  Nausea & Vomiting: no nausea and no vomiting  Complications: no  Cardiovascular status: blood pressure returned to baseline  Respiratory status: acceptable  Hydration status: euvolemic

## 2021-10-06 NOTE — INTERVAL H&P NOTE
Update History & Physical    The patient's History and Physical of September 9, 2021 was reviewed with the patient and I examined the patient. There was no change. He has not had any further rectal bleeding. He stopped eating spicy foods and all of the symptoms seems to have resolved. Plan: The risks, benefits, expected outcome, and alternative to the recommended procedure have been discussed with the patient. Patient understands and wants to proceed with the procedure.      Electronically signed by Dana Solis MD on 10/6/2021 at 7:58 AM

## 2021-10-06 NOTE — ANESTHESIA PRE PROCEDURE
Department of Anesthesiology  Preprocedure Note       Name:  Regla Arriaga   Age:  27 y.o.  :  1990                                          MRN:  [id number]         Date:  10/6/2021      Surgeon: Mary Ríos):  Ayaz Nieves MD    Procedure: Procedure(s):  COLONOSCOPY DIAGNOSTIC    Medications prior to admission:   Prior to Admission medications    Medication Sig Start Date End Date Taking? Authorizing Provider   bisacodyl (DULCOLAX) 5 MG EC tablet Take 4 tablets orally twice the day before colonoscopy as directed 21  Yes Ayaz Nieves MD   magnesium citrate solution Take 300 mLs by mouth 3 times daily for 3 doses Take one 10 ounce bottle three times the day before colonoscopy as directed 9/9/21 9/10/21  Ayaz Nieves MD   hydrocortisone (ANUSOL-HC) 2.5 % CREA rectal cream Apply to anal area as directed 3 times a day and after each BM. 21   Ayaz Nieves MD       Current medications:    Current Facility-Administered Medications   Medication Dose Route Frequency Provider Last Rate Last Admin    0.9 % sodium chloride infusion  25 mL IntraVENous PRN Ayaz Nieves MD        lactated ringers infusion   IntraVENous Continuous Ayaz Nieves MD        sodium chloride flush 0.9 % injection 10 mL  10 mL IntraVENous 2 times per day Ayaz Nieves MD        sodium chloride flush 0.9 % injection 10 mL  10 mL IntraVENous PRN Ayaz Nieves MD           Allergies:  No Known Allergies    Problem List:    Patient Active Problem List   Diagnosis Code    Rectal bleeding K62.5    Anal pain K62.89       Past Medical History:        Diagnosis Date    Rectal bleeding        Past Surgical History:  History reviewed. No pertinent surgical history.     Social History:    Social History     Tobacco Use    Smoking status: Never Smoker    Smokeless tobacco: Never Used   Substance Use Topics    Alcohol use: Never                                Counseling given: Not Answered      Vital Signs (Current):   Vitals:    10/06/21 0739 10/06/21 0747   BP:  136/86   Pulse:  87   Resp:  20   Temp:  97 °F (36.1 °C)   TempSrc:  Temporal   SpO2:  99%   Weight: 200 lb (90.7 kg)    Height: 6' 3\" (1.905 m)                                               BP Readings from Last 3 Encounters:   10/06/21 136/86   09/09/21 (!) 129/94   09/05/21 (!) 145/86       NPO Status:  > 8hrs                                                                               BMI:   Wt Readings from Last 3 Encounters:   10/06/21 200 lb (90.7 kg)   09/09/21 220 lb (99.8 kg)   09/05/21 218 lb 4.1 oz (99 kg)     Body mass index is 25 kg/m². CBC:   Lab Results   Component Value Date    WBC 9.4 09/05/2021    RBC 5.90 09/05/2021    HGB 17.2 09/05/2021    HCT 50.7 09/05/2021    MCV 85.9 09/05/2021    RDW 13.0 09/05/2021     09/05/2021       CMP: No results found for: NA, K, CL, CO2, BUN, CREATININE, GFRAA, AGRATIO, LABGLOM, GLUCOSE, PROT, CALCIUM, BILITOT, ALKPHOS, AST, ALT    POC Tests: No results for input(s): POCGLU, POCNA, POCK, POCCL, POCBUN, POCHEMO, POCHCT in the last 72 hours.     Coags: No results found for: PROTIME, INR, APTT    HCG (If Applicable): No results found for: PREGTESTUR, PREGSERUM, HCG, HCGQUANT     ABGs: No results found for: PHART, PO2ART, THH0FIB, LUM6MSO, BEART, D5YLRPTF     Type & Screen (If Applicable):  No results found for: LABABO, LABRH    Drug/Infectious Status (If Applicable):  No results found for: HIV, HEPCAB    COVID-19 Screening (If Applicable): No results found for: COVID19        Anesthesia Evaluation  Patient summary reviewed and Nursing notes reviewed  Airway: Mallampati: II  TM distance: >3 FB   Neck ROM: full  Mouth opening: > = 3 FB Dental: normal exam         Pulmonary:Negative Pulmonary ROS and normal exam  breath sounds clear to auscultation                             Cardiovascular:  Exercise tolerance: good (>4 METS),           Rhythm: regular  Rate: normal Neuro/Psych:   Negative Neuro/Psych ROS              GI/Hepatic/Renal:            ROS comment: hemocult. Endo/Other: Negative Endo/Other ROS                    Abdominal:             Vascular: negative vascular ROS. Other Findings:             Anesthesia Plan      MAC     ASA 2       Induction: intravenous. MIPS: Prophylactic antiemetics administered. Anesthetic plan and risks discussed with patient. Plan discussed with CRNA.                   Marylee Brady, DO   10/6/2021

## 2022-05-31 ENCOUNTER — IMMUNIZATION (OUTPATIENT)
Dept: PRIMARY CARE CLINIC | Age: 32
End: 2022-05-31
Payer: COMMERCIAL

## 2022-05-31 PROCEDURE — 91305 COVID-19, PFIZER GRAY TOP, DO NOT DILUTE, TRIS-SUCROSE, 12+ YRS, PF, 30MCG/ 0.3 ML DOSE: CPT | Performed by: FAMILY MEDICINE

## 2022-05-31 PROCEDURE — 0053A COVID-19, PFIZER GRAY TOP, DO NOT DILUTE, TRIS-SUCROSE, 12+ YRS, PF, 30MCG/ 0.3 ML DOSE: CPT | Performed by: FAMILY MEDICINE

## (undated) DEVICE — DEFENDO AIR WATER SUCTION AND BIOPSY VALVE KIT FOR  OLYMPUS: Brand: DEFENDO AIR/WATER/SUCTION AND BIOPSY VALVE

## (undated) DEVICE — CONNECTOR IRRIGATION AUXILIARY H2O JET W/ PRT MTL THRD HYDR

## (undated) DEVICE — Z DISCONTINUED NO SUB IDED TUBING ETCO2 AD L6.5FT NSL ORAL CVD PRNG NONFLARED TIP OVR

## (undated) DEVICE — GAUZE,SPONGE,POST-OP,4X3,STRL,LF: Brand: MEDLINE